# Patient Record
Sex: FEMALE | Race: WHITE | NOT HISPANIC OR LATINO | Employment: FULL TIME | URBAN - METROPOLITAN AREA
[De-identification: names, ages, dates, MRNs, and addresses within clinical notes are randomized per-mention and may not be internally consistent; named-entity substitution may affect disease eponyms.]

---

## 2024-06-30 ENCOUNTER — HOSPITAL ENCOUNTER (EMERGENCY)
Facility: HOSPITAL | Age: 44
Discharge: HOME | End: 2024-06-30
Payer: COMMERCIAL

## 2024-06-30 ENCOUNTER — APPOINTMENT (OUTPATIENT)
Dept: RADIOLOGY | Facility: HOSPITAL | Age: 44
End: 2024-06-30
Payer: COMMERCIAL

## 2024-06-30 VITALS
RESPIRATION RATE: 19 BRPM | OXYGEN SATURATION: 99 % | DIASTOLIC BLOOD PRESSURE: 94 MMHG | WEIGHT: 268 LBS | HEART RATE: 97 BPM | HEIGHT: 67 IN | BODY MASS INDEX: 42.06 KG/M2 | SYSTOLIC BLOOD PRESSURE: 152 MMHG | TEMPERATURE: 97.4 F

## 2024-06-30 DIAGNOSIS — M25.511 RIGHT SHOULDER PAIN, UNSPECIFIED CHRONICITY: Primary | ICD-10-CM

## 2024-06-30 PROCEDURE — 99283 EMERGENCY DEPT VISIT LOW MDM: CPT

## 2024-06-30 PROCEDURE — 73030 X-RAY EXAM OF SHOULDER: CPT | Mod: RT

## 2024-06-30 PROCEDURE — 73030 X-RAY EXAM OF SHOULDER: CPT | Mod: RIGHT SIDE | Performed by: RADIOLOGY

## 2024-06-30 ASSESSMENT — PAIN DESCRIPTION - DESCRIPTORS: DESCRIPTORS: SHOOTING

## 2024-06-30 ASSESSMENT — PAIN DESCRIPTION - FREQUENCY: FREQUENCY: CONSTANT/CONTINUOUS

## 2024-06-30 ASSESSMENT — COLUMBIA-SUICIDE SEVERITY RATING SCALE - C-SSRS
1. IN THE PAST MONTH, HAVE YOU WISHED YOU WERE DEAD OR WISHED YOU COULD GO TO SLEEP AND NOT WAKE UP?: NO
2. HAVE YOU ACTUALLY HAD ANY THOUGHTS OF KILLING YOURSELF?: NO
6. HAVE YOU EVER DONE ANYTHING, STARTED TO DO ANYTHING, OR PREPARED TO DO ANYTHING TO END YOUR LIFE?: NO

## 2024-06-30 ASSESSMENT — PAIN SCALES - GENERAL: PAINLEVEL_OUTOF10: 8

## 2024-06-30 ASSESSMENT — PAIN - FUNCTIONAL ASSESSMENT: PAIN_FUNCTIONAL_ASSESSMENT: 0-10

## 2024-06-30 ASSESSMENT — PAIN DESCRIPTION - PAIN TYPE: TYPE: ACUTE PAIN

## 2024-06-30 ASSESSMENT — PAIN DESCRIPTION - LOCATION: LOCATION: ARM

## 2024-06-30 ASSESSMENT — PAIN DESCRIPTION - ORIENTATION: ORIENTATION: RIGHT

## 2024-06-30 NOTE — ED TRIAGE NOTES
Patient arrived to ED from home wth c/o right shoulder pain that radiates down to her elbow. Patient reports numbness to right elbow. Patient reports a slip and fall 3 wks ago, was initially in pain for a few days, pain went away. Patient reports she woke up yesterday to severe pain that keeps getting worse. Patient reports taking 800mg Ibupr around 1800 last night. Patient denies any other pain. Patient stable at this time.

## 2024-06-30 NOTE — ED PROVIDER NOTES
EMERGENCY MEDICINE EVALUATION NOTE    History of Present Illness     Chief Complaint:   Chief Complaint   Patient presents with    Shoulder Pain     Fell 3 wks ago on right shoulder       HPI: Oumou Cox is a 43 y.o. female presents with a chief complaint of right shoulder pain.  Patient reports that it started about 3 weeks ago after she fell onto the right shoulder.  She states that initially only hurt for 3 days but it went away.  She states that on Friday started to come back.  She reports that she has more pain with range of motion.  Reports that any abduction increases pain.  Patient denies any loss of neurovascular status to the right upper extremity.  She denies associated chest pain or shortness of breath.  Patient denies any previous surgery or fractures to the right upper extremity.  She reports he takes ibuprofen at home which has minimally improved the pain.    Previous History   No past medical history on file.  Past Surgical History:   Procedure Laterality Date    OTHER SURGICAL HISTORY  07/05/2019    Ectopic pregnancy removal with salpingectomy    OTHER SURGICAL HISTORY  01/09/2020    Appendectomy    OTHER SURGICAL HISTORY  01/09/2020    Oophorectomy    OTHER SURGICAL HISTORY  01/09/2020    Hysterectomy        No family history on file.  Allergies   Allergen Reactions    Shellfish Derived Anaphylaxis    Penicillins GI Upset     No current outpatient medications    Physical Exam     Appearance: Alert, oriented , cooperative     Skin: Intact,  dry skin, no lesions, rash, petechiae or purpura.      Eyes: PERRLA, EOMs intact,  Conjunctiva pink      ENT: Hearing grossly intact. Pharynx clear     Neck: Supple. Trachea at midline.      Pulmonary: Clear bilaterally. No rales, rhonchi or wheezing. No accessory muscle use or stridor.     Cardiac: Normal rate and rhythm without murmur     Abdomen: Soft, nontender, active bowel sounds.     Musculoskeletal: Decreased range of motion to right shoulder  "secondary to pain.  Patient does have minimal abduction and adduction as well as flexion extension however range of motion is decreased secondary to pain.  Neurovascular intact in right upper extremity.  Full range of motion at right elbow.  Patient has diffuse tenderness across anterior lateral shoulder joint specifically near AC joint.     Neurological:Cranial nerves II through XII are grossly intact, normal sensation, no weakness, no focal findings identified.     Results   Labs Reviewed - No data to display  XR shoulder right 2+ views   Final Result   Limited exam shows no acute bony abnormalities             MACRO:   None        Signed by: Laisha Archuleta 6/30/2024 7:22 AM   Dictation workstation:   [id number]            ED Course & Medical Decision Making   Medications - No data to display  Heart Rate:  [97]   Temperature:  [36.3 °C (97.4 °F)]   Respirations:  [19]   BP: (152)/(94)   Height:  [170.2 cm (5' 7\")]   Weight:  [122 kg (268 lb)]   Pulse Ox:  [99 %]    ED Course as of 06/30/24 0748   Sun Jun 30, 2024   0651 Discussed plan of care with the patient.  He was offered Toradol IM injection which she declined.  Patient also offered EKG which she declined stating she has no chest pain.  She requested to only have x-ray performed at this time.  Patient will have x-ray and be reassessed. [CJ]   9157 Updated the patient on the results of the x-ray.  Patient's x-ray did not show any fractures or dislocations.  Plan of care will include providing her with a sling.  We discussed pain medication at home.  Patient was offered muscle relaxer which she declined stating it does not not make her feel well.  Patient states that she will just take Tylenol Motrin.  We discussed the dosing regimen for this.  Patient be given referral to orthopedics for follow-up as well.  Patient encouraged return here immediately with any worsening symptoms. [CJ]      ED Course User Index  [CJ] Aureliano Garrido PA-C         Diagnoses as of " 06/30/24 0748   Right shoulder pain, unspecified chronicity       Procedures   Procedures    Diagnosis     1. Right shoulder pain, unspecified chronicity        Disposition   Discharged     ED Prescriptions    None         Disclaimer: This note was dictated by speech recognition. Minor errors in transcription may be present. Please call if questions.       Aureliano Garrido PA-C  06/30/24 0748

## 2024-10-21 ENCOUNTER — HOSPITAL ENCOUNTER (EMERGENCY)
Facility: HOSPITAL | Age: 44
Discharge: HOME | End: 2024-10-21
Payer: COMMERCIAL

## 2024-10-21 ENCOUNTER — APPOINTMENT (OUTPATIENT)
Dept: RADIOLOGY | Facility: HOSPITAL | Age: 44
End: 2024-10-21
Payer: COMMERCIAL

## 2024-10-21 VITALS
OXYGEN SATURATION: 96 % | DIASTOLIC BLOOD PRESSURE: 85 MMHG | SYSTOLIC BLOOD PRESSURE: 138 MMHG | RESPIRATION RATE: 18 BRPM | WEIGHT: 260 LBS | HEIGHT: 67 IN | TEMPERATURE: 97.3 F | HEART RATE: 64 BPM | BODY MASS INDEX: 40.81 KG/M2

## 2024-10-21 DIAGNOSIS — R19.7 DIARRHEA, UNSPECIFIED TYPE: Primary | ICD-10-CM

## 2024-10-21 LAB
ALBUMIN SERPL BCP-MCNC: 5 G/DL (ref 3.4–5)
ALP SERPL-CCNC: 70 U/L (ref 33–110)
ALT SERPL W P-5'-P-CCNC: 26 U/L (ref 7–45)
ANION GAP SERPL CALC-SCNC: 11 MMOL/L (ref 10–20)
APPEARANCE UR: CLEAR
AST SERPL W P-5'-P-CCNC: 21 U/L (ref 9–39)
B-HCG SERPL-ACNC: 3 MIU/ML
BASOPHILS # BLD AUTO: 0.1 X10*3/UL (ref 0–0.1)
BASOPHILS NFR BLD AUTO: 1.2 %
BILIRUB SERPL-MCNC: 0.4 MG/DL (ref 0–1.2)
BILIRUB UR STRIP.AUTO-MCNC: NEGATIVE MG/DL
BUN SERPL-MCNC: 12 MG/DL (ref 6–23)
CALCIUM SERPL-MCNC: 9.9 MG/DL (ref 8.6–10.3)
CHLORIDE SERPL-SCNC: 102 MMOL/L (ref 98–107)
CO2 SERPL-SCNC: 28 MMOL/L (ref 21–32)
COLOR UR: COLORLESS
CREAT SERPL-MCNC: 0.79 MG/DL (ref 0.5–1.05)
EGFRCR SERPLBLD CKD-EPI 2021: >90 ML/MIN/1.73M*2
EOSINOPHIL # BLD AUTO: 0.36 X10*3/UL (ref 0–0.7)
EOSINOPHIL NFR BLD AUTO: 4.2 %
ERYTHROCYTE [DISTWIDTH] IN BLOOD BY AUTOMATED COUNT: 12.5 % (ref 11.5–14.5)
GLUCOSE SERPL-MCNC: 106 MG/DL (ref 74–99)
GLUCOSE UR STRIP.AUTO-MCNC: NORMAL MG/DL
HCT VFR BLD AUTO: 48.8 % (ref 36–46)
HGB BLD-MCNC: 17 G/DL (ref 12–16)
HOLD SPECIMEN: NORMAL
IMM GRANULOCYTES # BLD AUTO: 0.02 X10*3/UL (ref 0–0.7)
IMM GRANULOCYTES NFR BLD AUTO: 0.2 % (ref 0–0.9)
KETONES UR STRIP.AUTO-MCNC: NEGATIVE MG/DL
LACTATE SERPL-SCNC: 0.9 MMOL/L (ref 0.4–2)
LEUKOCYTE ESTERASE UR QL STRIP.AUTO: NEGATIVE
LIPASE SERPL-CCNC: 37 U/L (ref 9–82)
LYMPHOCYTES # BLD AUTO: 2.86 X10*3/UL (ref 1.2–4.8)
LYMPHOCYTES NFR BLD AUTO: 33.2 %
MCH RBC QN AUTO: 30.7 PG (ref 26–34)
MCHC RBC AUTO-ENTMCNC: 34.8 G/DL (ref 32–36)
MCV RBC AUTO: 88 FL (ref 80–100)
MONOCYTES # BLD AUTO: 0.44 X10*3/UL (ref 0.1–1)
MONOCYTES NFR BLD AUTO: 5.1 %
NEUTROPHILS # BLD AUTO: 4.83 X10*3/UL (ref 1.2–7.7)
NEUTROPHILS NFR BLD AUTO: 56.1 %
NITRITE UR QL STRIP.AUTO: NEGATIVE
NRBC BLD-RTO: 0 /100 WBCS (ref 0–0)
PH UR STRIP.AUTO: 5 [PH]
PLATELET # BLD AUTO: 284 X10*3/UL (ref 150–450)
POTASSIUM SERPL-SCNC: 3.9 MMOL/L (ref 3.5–5.3)
PROT SERPL-MCNC: 9.1 G/DL (ref 6.4–8.2)
PROT UR STRIP.AUTO-MCNC: NEGATIVE MG/DL
RBC # BLD AUTO: 5.53 X10*6/UL (ref 4–5.2)
RBC # UR STRIP.AUTO: NEGATIVE /UL
SODIUM SERPL-SCNC: 137 MMOL/L (ref 136–145)
SP GR UR STRIP.AUTO: 1.01
UROBILINOGEN UR STRIP.AUTO-MCNC: NORMAL MG/DL
WBC # BLD AUTO: 8.6 X10*3/UL (ref 4.4–11.3)

## 2024-10-21 PROCEDURE — 2500000004 HC RX 250 GENERAL PHARMACY W/ HCPCS (ALT 636 FOR OP/ED): Performed by: PHYSICIAN ASSISTANT

## 2024-10-21 PROCEDURE — 83605 ASSAY OF LACTIC ACID: CPT | Performed by: PHYSICIAN ASSISTANT

## 2024-10-21 PROCEDURE — 74177 CT ABD & PELVIS W/CONTRAST: CPT | Performed by: RADIOLOGY

## 2024-10-21 PROCEDURE — 74177 CT ABD & PELVIS W/CONTRAST: CPT

## 2024-10-21 PROCEDURE — 84702 CHORIONIC GONADOTROPIN TEST: CPT | Performed by: PHYSICIAN ASSISTANT

## 2024-10-21 PROCEDURE — 85025 COMPLETE CBC W/AUTO DIFF WBC: CPT | Performed by: PHYSICIAN ASSISTANT

## 2024-10-21 PROCEDURE — 36415 COLL VENOUS BLD VENIPUNCTURE: CPT | Performed by: PHYSICIAN ASSISTANT

## 2024-10-21 PROCEDURE — 2550000001 HC RX 255 CONTRASTS: Performed by: PHYSICIAN ASSISTANT

## 2024-10-21 PROCEDURE — 83690 ASSAY OF LIPASE: CPT | Performed by: PHYSICIAN ASSISTANT

## 2024-10-21 PROCEDURE — 96360 HYDRATION IV INFUSION INIT: CPT | Mod: 59

## 2024-10-21 PROCEDURE — 81003 URINALYSIS AUTO W/O SCOPE: CPT | Performed by: PHYSICIAN ASSISTANT

## 2024-10-21 PROCEDURE — 80053 COMPREHEN METABOLIC PANEL: CPT | Performed by: PHYSICIAN ASSISTANT

## 2024-10-21 PROCEDURE — 99284 EMERGENCY DEPT VISIT MOD MDM: CPT | Mod: 25

## 2024-10-21 RX ORDER — DICYCLOMINE HYDROCHLORIDE 20 MG/1
20 TABLET ORAL 2 TIMES DAILY
Qty: 20 TABLET | Refills: 0 | Status: SHIPPED | OUTPATIENT
Start: 2024-10-21 | End: 2024-10-31

## 2024-10-21 ASSESSMENT — COLUMBIA-SUICIDE SEVERITY RATING SCALE - C-SSRS
6. HAVE YOU EVER DONE ANYTHING, STARTED TO DO ANYTHING, OR PREPARED TO DO ANYTHING TO END YOUR LIFE?: NO
1. IN THE PAST MONTH, HAVE YOU WISHED YOU WERE DEAD OR WISHED YOU COULD GO TO SLEEP AND NOT WAKE UP?: NO
2. HAVE YOU ACTUALLY HAD ANY THOUGHTS OF KILLING YOURSELF?: NO

## 2024-10-21 ASSESSMENT — PAIN DESCRIPTION - LOCATION: LOCATION: ABDOMEN

## 2024-10-21 ASSESSMENT — PAIN SCALES - GENERAL: PAINLEVEL_OUTOF10: 5 - MODERATE PAIN

## 2024-10-21 ASSESSMENT — PAIN DESCRIPTION - DESCRIPTORS: DESCRIPTORS: SHARP

## 2024-10-21 ASSESSMENT — PAIN - FUNCTIONAL ASSESSMENT: PAIN_FUNCTIONAL_ASSESSMENT: 0-10

## 2024-10-21 NOTE — ED TRIAGE NOTES
Pt to ED with c/o LLQ abd pain and diarrhea x10 days. Reports 6 episodes since 0200, reports yellow liquid. Pt reports chills.

## 2024-10-21 NOTE — ED PROVIDER NOTES
EMERGENCY MEDICINE EVALUATION NOTE    History of Present Illness     Chief Complaint:   Chief Complaint   Patient presents with    Abdominal Pain    Diarrhea       HPI: Oumou Cox is a 44 y.o. female presents with a chief complaint of diarrhea.  Patient reports that she is been having diarrhea now for about 10 days.  She states that it started last Wednesday after eating takeout food.  She says she initially had food poisoning but the pain and diarrhea has persisted.  She states that she occasionally gets a spasm and cramping pain in the left lower quadrant.  Patient reports that the diarrhea is somewhat yellow in color but denies any blood present.  Patient denies any fevers or chills.  Patient did not take anything at home for symptoms.  She reports that she has had a little bit of a decreased p.o. intake secondary to not wanting to eat as much.  Patient denies any urinary symptoms such as dysuria or frequency.  Patient has any history of any diverticulitis or colitis.    Previous History     Past Medical History:   Diagnosis Date    Asthma     Diabetes mellitus (Multi)      Past Surgical History:   Procedure Laterality Date    HYSTERECTOMY      OTHER SURGICAL HISTORY  07/05/2019    Ectopic pregnancy removal with salpingectomy    OTHER SURGICAL HISTORY  01/09/2020    Appendectomy    OTHER SURGICAL HISTORY  01/09/2020    Oophorectomy    OTHER SURGICAL HISTORY  01/09/2020    Hysterectomy     Social History     Tobacco Use    Smoking status: Every Day     Types: Cigarettes    Smokeless tobacco: Never   Vaping Use    Vaping status: Never Used   Substance Use Topics    Alcohol use: Not Currently    Drug use: Never     No family history on file.  Allergies   Allergen Reactions    Shellfish Derived Anaphylaxis    Penicillins GI Upset     Current Outpatient Medications   Medication Instructions    dicyclomine (BENTYL) 20 mg, oral, 2 times daily       Physical Exam     Appearance: =Alert, oriented , cooperative      Skin: Intact,  dry skin, no lesions, rash, petechiae or purpura.      Eyes: PERRLA, EOMs intact,  Conjunctiva pink      ENT: Hearing grossly intact. Pharynx clear     Neck: Supple. Trachea at midline.      Pulmonary: Clear bilaterally. No rales, rhonchi or wheezing. No accessory muscle use or stridor.     Cardiac: Tachycardic.     Abdomen: Soft, active bowel sounds.  Left lower quadrant abdominal tenderness without guarding or rebound.     Musculoskeletal: Full range of motion.      Neurological:Cranial nerves II through XII are grossly intact, normal sensation, no weakness, no focal findings identified.     Results     Labs Reviewed   CBC WITH AUTO DIFFERENTIAL - Abnormal       Result Value    WBC 8.6      nRBC 0.0      RBC 5.53 (*)     Hemoglobin 17.0 (*)     Hematocrit 48.8 (*)     MCV 88      MCH 30.7      MCHC 34.8      RDW 12.5      Platelets 284      Neutrophils % 56.1      Immature Granulocytes %, Automated 0.2      Lymphocytes % 33.2      Monocytes % 5.1      Eosinophils % 4.2      Basophils % 1.2      Neutrophils Absolute 4.83      Immature Granulocytes Absolute, Automated 0.02      Lymphocytes Absolute 2.86      Monocytes Absolute 0.44      Eosinophils Absolute 0.36      Basophils Absolute 0.10     COMPREHENSIVE METABOLIC PANEL - Abnormal    Glucose 106 (*)     Sodium 137      Potassium 3.9      Chloride 102      Bicarbonate 28      Anion Gap 11      Urea Nitrogen 12      Creatinine 0.79      eGFR >90      Calcium 9.9      Albumin 5.0      Alkaline Phosphatase 70      Total Protein 9.1 (*)     AST 21      Bilirubin, Total 0.4      ALT 26     URINALYSIS WITH REFLEX CULTURE AND MICROSCOPIC - Abnormal    Color, Urine Colorless (*)     Appearance, Urine Clear      Specific Gravity, Urine 1.007      pH, Urine 5.0      Protein, Urine NEGATIVE      Glucose, Urine Normal      Blood, Urine NEGATIVE      Ketones, Urine NEGATIVE      Bilirubin, Urine NEGATIVE      Urobilinogen, Urine Normal      Nitrite, Urine  NEGATIVE      Leukocyte Esterase, Urine NEGATIVE     LACTATE - Normal    Lactate 0.9      Narrative:     Venipuncture immediately after or during the administration of Metamizole may lead to falsely low results. Testing should be performed immediately prior to Metamizole dosing.   LIPASE - Normal    Lipase 37      Narrative:     Venipuncture immediately after or during the administration of Metamizole may lead to falsely low results. Testing should be performed immediately prior to Metamizole dosing.   HUMAN CHORIONIC GONADOTROPIN, SERUM QUANTITATIVE - Normal    HCG, Beta-Quantitative 3      Narrative:      Total HCG measurement is performed using the [employer]   Immunoassay which detects intact HCG and free beta HCG subunit.    This test is not indicated for use as a tumor marker.   HCG testing is performed using a different test methodology at [facility] than other [facility]. Direct result comparison   should only be made within the same method.       URINALYSIS WITH REFLEX CULTURE AND MICROSCOPIC    Narrative:     The following orders were created for panel order Urinalysis with Reflex Culture and Microscopic.  Procedure                               Abnormality         Status                     ---------                               -----------         ------                     Urinalysis with Reflex C...[[id number]]  Abnormal            Final result               Extra Urine Gray Tube[[id number]]                            In process                   Please view results for these tests on the individual orders.   EXTRA URINE GRAY TUBE     CT abdomen pelvis w IV contrast   Final Result   No acute abnormality.        Stable bilateral adrenal nodules likely adenomas.        Hepatomegaly with hepatic steatosis.        Signed by: Mirna Sánchez 10/21/2024 12:09 PM   Dictation workstation:   [id number]            ED Course & Medical Decision Making     Medications   sodium  "chloride 0.9 % bolus 1,000 mL (0 mL intravenous Stopped 10/21/24 1053)   iohexol (OMNIPaque) 350 mg iodine/mL solution 100 mL (100 mL intravenous Given 10/21/24 1101)     Heart Rate:  [118]   Temperature:  [36.3 °C (97.3 °F)]   Respirations:  [18]   BP: (170)/(103)   Height:  [170.2 cm (5' 7\")]   Weight:  [118 kg (260 lb)]   Pulse Ox:  [97 %]    ED Course as of 10/21/24 1235   Mon Oct 21, 2024   1232 Updated the patient the results of her workup.  Patient's workup does not show any significant abnormalities.  Patient CBC was within normal limits CMP did not show any acute abnormalities.  Urinalysis was normal, lipase was negative.  Patient CT imaging of the abdomen pelvis did not show any specific colitis or diverticulitis.  Discussed plan of care going forward patient.  Patient given referral to GI and will also be given Bentyl at home for her symptoms.  Patient encouraged to return the emerged primarily with any worsening symptoms or to follow-up with her primary care provider in 1 to 2 days. [CJ]      ED Course User Index  [CJ] Aureliano Garrido PA-C         Diagnoses as of 10/21/24 1235   Diarrhea, unspecified type       Procedures   Procedures    Diagnosis     1. Diarrhea, unspecified type        Disposition   Discharged    ED Prescriptions       Medication Sig Dispense Start Date End Date Auth. Provider    dicyclomine (Bentyl) 20 mg tablet Take 1 tablet (20 mg) by mouth 2 times a day for 10 days. 20 tablet 10/21/2024 10/31/2024 Aureliano Garrido PA-C            Disclaimer: This note was dictated by speech recognition. Minor errors in transcription may be present. Please call if questions.       Aureliano Garrido PA-C  10/21/24 1236    "

## 2025-02-14 ENCOUNTER — APPOINTMENT (OUTPATIENT)
Facility: CLINIC | Age: 45
End: 2025-02-14
Payer: COMMERCIAL

## 2025-02-14 VITALS
HEART RATE: 83 BPM | WEIGHT: 262.6 LBS | BODY MASS INDEX: 41.21 KG/M2 | HEIGHT: 67 IN | OXYGEN SATURATION: 95 % | DIASTOLIC BLOOD PRESSURE: 84 MMHG | SYSTOLIC BLOOD PRESSURE: 127 MMHG

## 2025-02-14 DIAGNOSIS — K59.09 CHRONIC CONSTIPATION: ICD-10-CM

## 2025-02-14 DIAGNOSIS — R19.4 CHANGE IN BOWEL HABITS: ICD-10-CM

## 2025-02-14 DIAGNOSIS — R10.9 ABDOMINAL PAIN, UNSPECIFIED ABDOMINAL LOCATION: Primary | ICD-10-CM

## 2025-02-14 PROBLEM — E78.5 HYPERLIPIDEMIA, UNSPECIFIED: Status: ACTIVE | Noted: 2023-05-23

## 2025-02-14 PROBLEM — E66.01 MORBID OBESITY (MULTI): Status: ACTIVE | Noted: 2023-06-07

## 2025-02-14 PROBLEM — K21.9 GERD (GASTROESOPHAGEAL REFLUX DISEASE): Status: ACTIVE | Noted: 2023-06-07

## 2025-02-14 PROBLEM — R51.9 HEADACHE: Status: ACTIVE | Noted: 2024-03-06

## 2025-02-14 PROBLEM — M25.512 LEFT SHOULDER PAIN: Status: ACTIVE | Noted: 2025-02-14

## 2025-02-14 PROBLEM — D39.10 NEOPLASM OF OVARY WITH LOW MALIGNANT POTENTIAL: Status: ACTIVE | Noted: 2021-05-03

## 2025-02-14 PROBLEM — R00.2 PALPITATIONS: Status: ACTIVE | Noted: 2025-02-14

## 2025-02-14 PROBLEM — K76.0 STEATOSIS OF LIVER: Status: ACTIVE | Noted: 2025-02-14

## 2025-02-14 PROBLEM — J45.909 ASTHMA: Status: ACTIVE | Noted: 2023-06-07

## 2025-02-14 PROBLEM — R94.39 ABNORMAL STRESS TEST: Status: ACTIVE | Noted: 2023-08-24

## 2025-02-14 PROBLEM — Z90.710 S/P HYSTERECTOMY: Status: RESOLVED | Noted: 2019-08-22 | Resolved: 2025-02-14

## 2025-02-14 PROBLEM — R06.02 SHORTNESS OF BREATH ON EXERTION: Status: ACTIVE | Noted: 2025-02-14

## 2025-02-14 PROBLEM — M54.9 BACK PAIN: Status: ACTIVE | Noted: 2025-02-14

## 2025-02-14 PROBLEM — E11.9 DIABETES (MULTI): Status: ACTIVE | Noted: 2023-06-07

## 2025-02-14 PROBLEM — K44.9 HIATAL HERNIA: Status: ACTIVE | Noted: 2023-06-07

## 2025-02-14 PROBLEM — K58.1 IRRITABLE BOWEL SYNDROME WITH CONSTIPATION: Status: ACTIVE | Noted: 2025-02-14

## 2025-02-14 PROCEDURE — 3079F DIAST BP 80-89 MM HG: CPT | Performed by: INTERNAL MEDICINE

## 2025-02-14 PROCEDURE — 3074F SYST BP LT 130 MM HG: CPT | Performed by: INTERNAL MEDICINE

## 2025-02-14 PROCEDURE — 3008F BODY MASS INDEX DOCD: CPT | Performed by: INTERNAL MEDICINE

## 2025-02-14 PROCEDURE — 99214 OFFICE O/P EST MOD 30 MIN: CPT | Performed by: INTERNAL MEDICINE

## 2025-02-14 RX ORDER — FAMOTIDINE 40 MG/1
40 TABLET, FILM COATED ORAL NIGHTLY
COMMUNITY
End: 2025-02-14 | Stop reason: ALTCHOICE

## 2025-02-14 RX ORDER — METFORMIN HYDROCHLORIDE 500 MG/1
500 TABLET, EXTENDED RELEASE ORAL
COMMUNITY
Start: 2024-11-11

## 2025-02-14 RX ORDER — DICYCLOMINE HYDROCHLORIDE 10 MG/1
10 CAPSULE ORAL 4 TIMES DAILY
COMMUNITY
End: 2025-02-14 | Stop reason: ALTCHOICE

## 2025-02-14 RX ORDER — OMEPRAZOLE 40 MG/1
40 CAPSULE, DELAYED RELEASE ORAL
COMMUNITY

## 2025-02-14 ASSESSMENT — ENCOUNTER SYMPTOMS
ARTHRALGIAS: 0
DYSURIA: 0
WHEEZING: 0
ADENOPATHY: 0
BRUISES/BLEEDS EASILY: 0
SEIZURES: 0
SHORTNESS OF BREATH: 0
DIZZINESS: 0
HEMATURIA: 0
ROS GI COMMENTS: AS DETAILED ABOVE.
HEADACHES: 0
CONFUSION: 0
WEAKNESS: 0
SORE THROAT: 0
UNEXPECTED WEIGHT CHANGE: 0
NERVOUS/ANXIOUS: 0
CHILLS: 0
VOICE CHANGE: 0
FATIGUE: 0
PALPITATIONS: 0
FEVER: 0
COUGH: 0
TROUBLE SWALLOWING: 0
MYALGIAS: 0
NUMBNESS: 0

## 2025-02-14 NOTE — PROGRESS NOTES
"    [facility]astroenterology    ASSESSMENT and PLAN:       Oumou Cox is a 44 y.o. female with a significant past medical history of asthma, obesity, prediabetes, back pain, tobacco use, hepatic steatosis, GERD, anxiety, and HLD who presents for consultation requested by her primary care provider (Marie Greene MD) for the evaluation of constipation.       IBS-c  Symptoms of abdominal pain, constipation, and change in bowel movements that are most consistent with IBS-c. Possible that adhesions from previous surgeries are playing a role as well. No concerning findings on previous CT. With change in bowel habits will plan for colonoscopy to rule out other underlying process, but that is less likely and as I explained to her colonoscopy is not \"required\" for a diagnosis of IBS. Would not recommend Bentyl as the data is lacking for its effectiveness and it would potentially worsen constipation. Bentyl is recommended against in GI society guidelines for IBS. Suggest that she continue to use fiber supplement and consider the addition of MiraLAX for constipation.  - continue fiber supplement  - colonoscopy scheduled in endo  - trial of IBGard for symptoms      GERD  Symptoms of GERD that have been poorly controlled with daily H2RA. Lack of response is likely in part related to expected tachyphylaxis with daily use. Agree with plan to start PPI. No concerning findings on previous EGD. Would recommend that after 8-12 weeks of daily PPI she attempt to decrease the dose to the lowest effective dose/stop the medication. Weight loss and quitting tobacco will also help GERD.  - PPI      Follow up with GI as needed.          Caesar Bean MD        Senior Attending Physician, Gastroenterology    [facility]    Clinical   [facility] [employer]        Subjective   HISTORY OF PRESENT ILLNESS:     Chief " Complaint  Constipation    History Of Present Illness:    Oumou Cox is a 44 y.o. female with a significant past medical history of asthma, obesity, prediabetes, back pain, tobacco use, hepatic steatosis, GERD, anxiety, and HLD who presents for consultation requested by her primary care provider (Marie Greene MD) for the evaluation of constipation.     She was seen by her PCP in November 2019 and complained of RUQ pain. She was diagnosed with GERD and started on Omeprazole (20 mg daily). A RUQ US was also ordered which revealed hepatic steatosis, but was otherwise normal with no cholelithiasis. I had initially seen her in January 2020 at which time she complained of burning chest/epigastric pain that had worsened after weight gain. She had also reported unresolved symptoms despite a trial of PPI and EGD was done on 2/11/2020 which showed erythematous mucosa in the gastric antrum and duodenal bulb (mild gastritis with no H pylori, IM, or celiac on pathology), but was otherwise normal.     I last saw her in 2020 at which time she reported worse constipation since the time of her hysterectomy. We discussed starting MiraLAX and titrating the dose of that medication as needed. I had recommended follow up in 4-6 months, but she was then lost to follow up. She was subsequently seen by hepatology (Dr. Parker) for hepatic steatosis and fibroscan was done which showed stage 2 disease. He recommended repeat LFTs in 6 months and follow up with him in 6-7 months. This was in 2021 and she has not followed up with Dr. Parker since that time.    There are no recent notes from her PCP in the EMR. The most recent labs are from 2024 and include an unremarkable CBC, unremarkable CMP, and normal lipase.      She says that back in October she was having diarrhea. At that time she had been on Metformin. She stopped taking Metformin and symptoms improved. After that she had a course of antibiotics which again prompted diarrhea.  This lasted for a month and then her BMs returned to her baseline of constipation. She is now having a daily BM, but stool is small and hard. She will have straining with a BM as well. This has been associated with gas and bloating as well as left sided pain that she describes as a pressure. She has started a fiber supplement which has improved her BMs, but she continues to have a sensation of incomplete emptying.    She says that her primary care told her that IBS can't be diagnosed without a colonoscopy and requested that she get a colonoscopy.    She also reports issues with heartburn that she describes as a burning in her chest that is worse with laying down. She has been on Pepcid for this, but feels like that hasn't been controlling her symptoms. Her PCP recently prescribed Omeprazole.      Patient denies any N/V, dysphagia, odynophagia, hematemesis, hematochezia, melena, or unexpected weight loss.      Review of systems:   Review of Systems   Constitutional:  Negative for chills, fatigue, fever and unexpected weight change.   HENT:  Negative for congestion, sneezing, sore throat, trouble swallowing and voice change.    Respiratory:  Negative for cough, shortness of breath and wheezing.    Cardiovascular:  Negative for chest pain, palpitations and leg swelling.   Gastrointestinal:         As detailed above.   Genitourinary:  Negative for dysuria and hematuria.   Musculoskeletal:  Negative for arthralgias and myalgias.   Skin:  Negative for pallor and rash.   Neurological:  Negative for dizziness, seizures, syncope, weakness, numbness and headaches.   Hematological:  Negative for adenopathy. Does not bruise/bleed easily.   Psychiatric/Behavioral:  Negative for confusion. The patient is not nervous/anxious.    All other systems reviewed and are negative.      I performed a complete 10 point review of systems and it is negative except as noted in HPI or above.      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    Past Surgical History:  She has a past surgical history that includes Other surgical history (07/05/2019); Other surgical history (01/09/2020); Other surgical history (01/09/2020); Other surgical history (01/09/2020); and Hysterectomy.      Social History:  She reports that she has been smoking cigarettes. She has never used smokeless tobacco. She reports that she does not currently use alcohol. She reports that she does not use drugs.    Family History:  No known family history of GI disease, specifically denies any family history of pancreatitis, Crohn's, colon cancer, gastroesophageal cancer, or ulcerative colitis.    Family History   Problem Relation Name Age of Onset    Breast cancer Mother      Pancreatic cancer Father      Liver cancer Father      Heart disease Maternal Grandmother      Stroke Paternal Grandmother          Allergies:  Shellfish derived, Clindamycin, and Penicillins      Objective   OBJECTIVE:       Last Recorded Vitals:  Vitals:    02/14/25 1358   BP: 127/84   BP Location: Left arm   Patient Position: Sitting   BP Cuff Size: Large adult   Pulse: 83   SpO2: 95%   Weight: 119 kg (262 lb 9.6 oz)   Height: 1.702 m (5' 7\")     /84 (BP Location: Left arm, Patient Position: Sitting, BP Cuff Size: Large adult)   Pulse 83   Ht 1.702 m (5' 7\")   Wt 119 kg (262 lb 9.6 oz)   SpO2 95%   BMI 41.13 kg/m²      Physical " Exam:    Physical Exam  Vitals reviewed.   Constitutional:       General: She is not in acute distress.     Appearance: She is obese. She is not ill-appearing.   HENT:      Head: Normocephalic and atraumatic.   Eyes:      General: No scleral icterus.  Cardiovascular:      Rate and Rhythm: Normal rate and regular rhythm.      Pulses: Normal pulses.      Heart sounds: Normal heart sounds. No murmur heard.  Pulmonary:      Effort: Pulmonary effort is normal. No respiratory distress.      Breath sounds: Normal breath sounds. No wheezing.   Abdominal:      General: Bowel sounds are normal.      Palpations: Abdomen is soft.      Tenderness: There is no abdominal tenderness. There is no rebound.   Musculoskeletal:         General: No swelling or deformity.   Skin:     General: Skin is warm and dry.      Coloration: Skin is not jaundiced.      Findings: No rash.   Neurological:      General: No focal deficit present.      Mental Status: She is alert and oriented to person, place, and time.   Psychiatric:         Mood and Affect: Mood normal.         Behavior: Behavior normal.         Thought Content: Thought content normal.         Judgment: Judgment normal.         Home Medications:  Prior to Admission medications    Not on File         Relevant Results Recent labs reviewed in the EMR.    Lab Results   Component Value Date/Time    WBC 8.6 10/21/2024 0926    HGB 17.0 (H) 10/21/2024 0926    HGB 14.5 03/15/2022 2112    HGB 15.8 02/01/2021 1542    HGB 15.1 09/04/2020 1618    MCV 88 10/21/2024 0926     10/21/2024 0926     03/15/2022 2112     02/01/2021 1542     09/04/2020 1618       Lab Results   Component Value Date/Time     10/21/2024 0926    K 3.9 10/21/2024 0926     10/21/2024 0926    BUN 12 10/21/2024 0926    CREATININE 0.79 10/21/2024 0926    CREATININE 0.74 03/15/2022 2258    CREATININE 0.87 02/01/2021 1542       Lab Results   Component Value Date/Time    BILITOT 0.4 10/21/2024  "0926    BILITOT 0.4 02/01/2021 1542    BILITOT 0.3 11/10/2019 1157    ALKPHOS 70 10/21/2024 0926    ALKPHOS 71 02/01/2021 1542    ALKPHOS 61 11/10/2019 1157    AST 21 10/21/2024 0926    AST 24 02/01/2021 1542    AST 25 11/10/2019 1157    ALT 26 10/21/2024 0926    ALT 25 02/01/2021 1542    ALT 21 11/10/2019 1157    LIPASE 37 10/21/2024 0926       No results found for: \"CRP\", \"CALPS\"    Radiology: Imaging reviewed in the EMR.    === 10/21/24 ===    CT ABDOMEN PELVIS W IV CONTRAST    - Impression -  No acute abnormality.    Stable bilateral adrenal nodules likely adenomas.    Hepatomegaly with hepatic steatosis.    Signed by: Mirna Sánchez 10/21/2024 12:09 PM  Dictation workstation:   [id number]        "

## 2025-02-14 NOTE — PATIENT INSTRUCTIONS
Continue Omeprazole to block acid in your stomach.  You should take this medication every day.  Take it first thing in the morning about 30 minutes before breakfast.      Continue to use a fiber supplement for constipation. If you need to add something else I would recommend MiraLAX. This is available over the counter. I would recommend that you start taking 1 capful once daily.  If that is not effective after 4-5 days you can increase the dose.  You can increase the dose up to 2 capfuls twice daily if needed.  If the initial dose is causing your stools to be too loose or too many bowel movements then you can decrease the dose as much as you need to, but try to find a dose that you can take every day or every other day that is giving you soft and easy to pass stools.      You can also try an over the counter supplement called IBGard. This can help abdominal discomfort and bloating.      You have been scheduled for a colonoscopy.  You were given instructions for preparing for this test in the office today.  If you have questions about these instructions, please call my office at [phone].    After your procedure, you can expect me to talk to you to go over the results of the procedure. If polyps were removed I will usually be able to tell you my initial thoughts about those polyps and give you some idea of when you should have another colonoscopy.    If any polyps are removed during your procedure or if any biopsies are obtained those specimens will go to the pathologists to review under the microscope. Once those results are available they will be sent to me electronically to review. These results will also be available to you at that time through [facility]. I briefly review all of these results by the next business day to determine if there is any urgent information that needs to be communicated to you right away or anything that would significantly change what I told you at the time of the procedure. If there  is nothing urgent this is usually a good sign and I will then do a more extensive review of the result and send you a letter with my final recommendations within a week of the result becoming available. If you have questions or need additional information I urge you to call the office at [phone], but I do ask for patience as I spend a good portion of each day performing procedures like the one you are scheduled for and during those times I am not able to take or return routine phone calls.    You were also given information regarding the schedule for your procedure including the time that you need to arrive to the endoscopy unit.  You will also be contacted 2-3 day prior to your procedure to confirm the final arrival time.  If you have questions about this or if you need to cancel or change this appointment please call my office at [phone].         Follow up with GI as needed.

## 2025-02-14 NOTE — LETTER
"February 14, 2025     Marie Greene MD  [address]  [address] [address]    Patient: Oumou Cox   YOB: 1980   Date of Visit: 2/14/2025       Dear Dr. Marie Greene MD:    Thank you for referring Oumou Cox to me for evaluation. Below are my notes for this consultation.  If you have questions, please do not hesitate to call me. I look forward to following your patient along with you.       Sincerely,     Caesar Bean MD      CC: No Recipients  ______________________________________________________________________________________         [facility]    ASSESSMENT and PLAN:       Oumou Cox is a 44 y.o. female with a significant past medical history of asthma, obesity, prediabetes, back pain, tobacco use, hepatic steatosis, GERD, anxiety, and HLD who presents for consultation requested by her primary care provider (Marie Greene MD) for the evaluation of constipation.       IBS-c  Symptoms of abdominal pain, constipation, and change in bowel movements that are most consistent with IBS-c. Possible that adhesions from previous surgeries are playing a role as well. No concerning findings on previous CT. With change in bowel habits will plan for colonoscopy to rule out other underlying process, but that is less likely and as I explained to her colonoscopy is not \"required\" for a diagnosis of IBS. Would not recommend Bentyl as the data is lacking for its effectiveness and it would potentially worsen constipation. Bentyl is recommended against in GI society guidelines for IBS. Suggest that she continue to use fiber supplement and consider the addition of MiraLAX for constipation.  - continue fiber supplement  - colonoscopy scheduled in endo  - trial of IBGard for symptoms      GERD  Symptoms of GERD that have been poorly controlled with daily H2RA. Lack of response is likely in part related to expected tachyphylaxis with daily use. Agree with plan " to start PPI. No concerning findings on previous EGD. Would recommend that after 8-12 weeks of daily PPI she attempt to decrease the dose to the lowest effective dose/stop the medication. Weight loss and quitting tobacco will also help GERD.  - PPI      Follow up with GI as needed.          Caesar Bean MD        Senior Attending Physician, Gastroenterology    [facility]    Clinical   [employer]        Subjective   HISTORY OF PRESENT ILLNESS:     Chief Complaint  Constipation    History Of Present Illness:    Oumou Cox is a 44 y.o. female with a significant past medical history of asthma, obesity, prediabetes, back pain, tobacco use, hepatic steatosis, GERD, anxiety, and HLD who presents for consultation requested by her primary care provider (Marie Greene MD) for the evaluation of constipation.     She was seen by her PCP in November 2019 and complained of RUQ pain. She was diagnosed with GERD and started on Omeprazole (20 mg daily). A RUQ US was also ordered which revealed hepatic steatosis, but was otherwise normal with no cholelithiasis. I had initially seen her in January 2020 at which time she complained of burning chest/epigastric pain that had worsened after weight gain. She had also reported unresolved symptoms despite a trial of PPI and EGD was done on 2/11/2020 which showed erythematous mucosa in the gastric antrum and duodenal bulb (mild gastritis with no H pylori, IM, or celiac on pathology), but was otherwise normal.     I last saw her in 2020 at which time she reported worse constipation since the time of her hysterectomy. We discussed starting MiraLAX and titrating the dose of that medication as needed. I had recommended follow up in 4-6 months, but she was then lost to follow up. She was subsequently seen by hepatology (Dr. Parker) for hepatic steatosis and fibroscan was  done which showed stage 2 disease. He recommended repeat LFTs in 6 months and follow up with him in 6-7 months. This was in 2021 and she has not followed up with Dr. Parker since that time.    There are no recent notes from her PCP in the EMR. The most recent labs are from 2024 and include an unremarkable CBC, unremarkable CMP, and normal lipase.      She says that back in October she was having diarrhea. At that time she had been on Metformin. She stopped taking Metformin and symptoms improved. After that she had a course of antibiotics which again prompted diarrhea. This lasted for a month and then her BMs returned to her baseline of constipation. She is now having a daily BM, but stool is small and hard. She will have straining with a BM as well. This has been associated with gas and bloating as well as left sided pain that she describes as a pressure. She has started a fiber supplement which has improved her BMs, but she continues to have a sensation of incomplete emptying.    She says that her primary care told her that IBS can't be diagnosed without a colonoscopy and requested that she get a colonoscopy.    She also reports issues with heartburn that she describes as a burning in her chest that is worse with laying down. She has been on Pepcid for this, but feels like that hasn't been controlling her symptoms. Her PCP recently prescribed Omeprazole.      Patient denies any N/V, dysphagia, odynophagia, hematemesis, hematochezia, melena, or unexpected weight loss.      Review of systems:   Review of Systems   Constitutional:  Negative for chills, fatigue, fever and unexpected weight change.   HENT:  Negative for congestion, sneezing, sore throat, trouble swallowing and voice change.    Respiratory:  Negative for cough, shortness of breath and wheezing.    Cardiovascular:  Negative for chest pain, palpitations and leg swelling.   Gastrointestinal:         As detailed above.   Genitourinary:  Negative for dysuria  and hematuria.   Musculoskeletal:  Negative for arthralgias and myalgias.   Skin:  Negative for pallor and rash.   Neurological:  Negative for dizziness, seizures, syncope, weakness, numbness and headaches.   Hematological:  Negative for adenopathy. Does not bruise/bleed easily.   Psychiatric/Behavioral:  Negative for confusion. The patient is not nervous/anxious.    All other systems reviewed and are negative.      I performed a complete 10 point review of systems and it is negative except as noted in HPI or above.      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    Past Surgical History:  She has a past surgical history that includes Other surgical history (07/05/2019); Other surgical history (01/09/2020); Other surgical history (01/09/2020); Other surgical history (01/09/2020); and Hysterectomy.      Social History:  She reports that she has been smoking cigarettes. She has never used smokeless tobacco. She reports that she does not currently use alcohol. She reports that she does not use drugs.    Family History:  No known family history of GI disease, specifically denies any family history of pancreatitis, Crohn's, colon cancer, gastroesophageal cancer, or ulcerative colitis.    Family History   Problem Relation Name Age of Onset   • Breast cancer Mother     • Pancreatic cancer Father     • Liver cancer Father     • Heart disease  "Maternal Grandmother     • Stroke Paternal Grandmother          Allergies:  Shellfish derived, Clindamycin, and Penicillins      Objective   OBJECTIVE:       Last Recorded Vitals:  Vitals:    02/14/25 1358   BP: 127/84   BP Location: Left arm   Patient Position: Sitting   BP Cuff Size: Large adult   Pulse: 83   SpO2: 95%   Weight: 119 kg (262 lb 9.6 oz)   Height: 1.702 m (5' 7\")     /84 (BP Location: Left arm, Patient Position: Sitting, BP Cuff Size: Large adult)   Pulse 83   Ht 1.702 m (5' 7\")   Wt 119 kg (262 lb 9.6 oz)   SpO2 95%   BMI 41.13 kg/m²      Physical Exam:    Physical Exam  Vitals reviewed.   Constitutional:       General: She is not in acute distress.     Appearance: She is obese. She is not ill-appearing.   HENT:      Head: Normocephalic and atraumatic.   Eyes:      General: No scleral icterus.  Cardiovascular:      Rate and Rhythm: Normal rate and regular rhythm.      Pulses: Normal pulses.      Heart sounds: Normal heart sounds. No murmur heard.  Pulmonary:      Effort: Pulmonary effort is normal. No respiratory distress.      Breath sounds: Normal breath sounds. No wheezing.   Abdominal:      General: Bowel sounds are normal.      Palpations: Abdomen is soft.      Tenderness: There is no abdominal tenderness. There is no rebound.   Musculoskeletal:         General: No swelling or deformity.   Skin:     General: Skin is warm and dry.      Coloration: Skin is not jaundiced.      Findings: No rash.   Neurological:      General: No focal deficit present.      Mental Status: She is alert and oriented to person, place, and time.   Psychiatric:         Mood and Affect: Mood normal.         Behavior: Behavior normal.         Thought Content: Thought content normal.         Judgment: Judgment normal.         Home Medications:  Prior to Admission medications    Not on File         Relevant Results Recent labs reviewed in the EMR.    Lab Results   Component Value Date/Time    WBC 8.6 10/21/2024 " "0926    HGB 17.0 (H) 10/21/2024 0926    HGB 14.5 03/15/2022 2112    HGB 15.8 02/01/2021 1542    HGB 15.1 09/04/2020 1618    MCV 88 10/21/2024 0926     10/21/2024 0926     03/15/2022 2112     02/01/2021 1542     09/04/2020 1618       Lab Results   Component Value Date/Time     10/21/2024 0926    K 3.9 10/21/2024 0926     10/21/2024 0926    BUN 12 10/21/2024 0926    CREATININE 0.79 10/21/2024 0926    CREATININE 0.74 03/15/2022 2258    CREATININE 0.87 02/01/2021 1542       Lab Results   Component Value Date/Time    BILITOT 0.4 10/21/2024 0926    BILITOT 0.4 02/01/2021 1542    BILITOT 0.3 11/10/2019 1157    ALKPHOS 70 10/21/2024 0926    ALKPHOS 71 02/01/2021 1542    ALKPHOS 61 11/10/2019 1157    AST 21 10/21/2024 0926    AST 24 02/01/2021 1542    AST 25 11/10/2019 1157    ALT 26 10/21/2024 0926    ALT 25 02/01/2021 1542    ALT 21 11/10/2019 1157    LIPASE 37 10/21/2024 0926       No results found for: \"CRP\", \"CALPS\"    Radiology: Imaging reviewed in the EMR.    === 10/21/24 ===    CT ABDOMEN PELVIS W IV CONTRAST    - Impression -  No acute abnormality.    Stable bilateral adrenal nodules likely adenomas.    Hepatomegaly with hepatic steatosis.    Signed by: Mirna Sánchez 10/21/2024 12:09 PM  Dictation workstation:   [id number]        "

## 2025-03-11 ENCOUNTER — APPOINTMENT (OUTPATIENT)
Dept: RADIOLOGY | Facility: HOSPITAL | Age: 45
End: 2025-03-11
Payer: COMMERCIAL

## 2025-03-14 ENCOUNTER — APPOINTMENT (OUTPATIENT)
Facility: CLINIC | Age: 45
End: 2025-03-14
Payer: COMMERCIAL

## 2025-03-20 ENCOUNTER — TELEPHONE (OUTPATIENT)
Facility: CLINIC | Age: 45
End: 2025-03-20
Payer: COMMERCIAL

## 2025-03-20 NOTE — TELEPHONE ENCOUNTER
Patient is scheduled for colonoscopy 3/24/25.  She currently has an infected boil at the end of her tailbone that is increasing in size.  She has had to have this cut open in the past.  She is asking if she should reschedule procedure until she can have this taken care of

## 2025-03-24 ENCOUNTER — APPOINTMENT (OUTPATIENT)
Dept: GASTROENTEROLOGY | Facility: HOSPITAL | Age: 45
End: 2025-03-24
Payer: COMMERCIAL

## 2025-05-15 ENCOUNTER — PREP FOR PROCEDURE (OUTPATIENT)
Facility: CLINIC | Age: 45
End: 2025-05-15
Payer: COMMERCIAL

## 2025-05-15 RX ORDER — SODIUM CHLORIDE 9 MG/ML
20 INJECTION, SOLUTION INTRAVENOUS CONTINUOUS
OUTPATIENT
Start: 2025-05-15 | End: 2025-05-15

## 2025-05-19 ENCOUNTER — APPOINTMENT (OUTPATIENT)
Dept: GASTROENTEROLOGY | Facility: HOSPITAL | Age: 45
End: 2025-05-19
Payer: COMMERCIAL

## 2025-06-23 ENCOUNTER — APPOINTMENT (OUTPATIENT)
Dept: GASTROENTEROLOGY | Facility: HOSPITAL | Age: 45
End: 2025-06-23
Payer: COMMERCIAL

## 2025-08-26 ENCOUNTER — HOSPITAL ENCOUNTER (EMERGENCY)
Facility: HOSPITAL | Age: 45
Discharge: HOME | End: 2025-08-27
Attending: STUDENT IN AN ORGANIZED HEALTH CARE EDUCATION/TRAINING PROGRAM
Payer: COMMERCIAL

## 2025-08-26 DIAGNOSIS — L03.213 PRESEPTAL CELLULITIS OF RIGHT EYE: Primary | ICD-10-CM

## 2025-08-26 PROCEDURE — 83605 ASSAY OF LACTIC ACID: CPT | Performed by: NURSE PRACTITIONER

## 2025-08-26 PROCEDURE — 99285 EMERGENCY DEPT VISIT HI MDM: CPT | Performed by: STUDENT IN AN ORGANIZED HEALTH CARE EDUCATION/TRAINING PROGRAM

## 2025-08-26 PROCEDURE — 2500000005 HC RX 250 GENERAL PHARMACY W/O HCPCS: Performed by: NURSE PRACTITIONER

## 2025-08-26 PROCEDURE — 36415 COLL VENOUS BLD VENIPUNCTURE: CPT | Performed by: NURSE PRACTITIONER

## 2025-08-26 PROCEDURE — 82435 ASSAY OF BLOOD CHLORIDE: CPT | Performed by: NURSE PRACTITIONER

## 2025-08-26 PROCEDURE — 85025 COMPLETE CBC W/AUTO DIFF WBC: CPT | Performed by: NURSE PRACTITIONER

## 2025-08-26 PROCEDURE — 84702 CHORIONIC GONADOTROPIN TEST: CPT | Performed by: NURSE PRACTITIONER

## 2025-08-26 RX ORDER — TETRACAINE HYDROCHLORIDE 5 MG/ML
2 SOLUTION OPHTHALMIC ONCE
Status: COMPLETED | OUTPATIENT
Start: 2025-08-26 | End: 2025-08-26

## 2025-08-26 RX ORDER — FLUORESCEIN SODIUM 1 MG/MG
1 STRIP OPHTHALMIC ONCE
Status: COMPLETED | OUTPATIENT
Start: 2025-08-26 | End: 2025-08-26

## 2025-08-26 RX ADMIN — FLUORESCEIN SODIUM 1 STRIP: 1 STRIP OPHTHALMIC at 23:58

## 2025-08-26 RX ADMIN — TETRACAINE HYDROCHLORIDE 2 DROP: 5 SOLUTION OPHTHALMIC at 23:58

## 2025-08-26 ASSESSMENT — LIFESTYLE VARIABLES
EVER HAD A DRINK FIRST THING IN THE MORNING TO STEADY YOUR NERVES TO GET RID OF A HANGOVER: NO
EVER FELT BAD OR GUILTY ABOUT YOUR DRINKING: NO
HAVE YOU EVER FELT YOU SHOULD CUT DOWN ON YOUR DRINKING: NO
HAVE PEOPLE ANNOYED YOU BY CRITICIZING YOUR DRINKING: NO
TOTAL SCORE: 0

## 2025-08-26 ASSESSMENT — PAIN - FUNCTIONAL ASSESSMENT: PAIN_FUNCTIONAL_ASSESSMENT: 0-10

## 2025-08-26 ASSESSMENT — PAIN SCALES - GENERAL: PAINLEVEL_OUTOF10: 7

## 2025-08-27 ENCOUNTER — APPOINTMENT (OUTPATIENT)
Dept: RADIOLOGY | Facility: HOSPITAL | Age: 45
End: 2025-08-27
Payer: COMMERCIAL

## 2025-08-27 VITALS
TEMPERATURE: 98.2 F | SYSTOLIC BLOOD PRESSURE: 156 MMHG | HEIGHT: 67 IN | DIASTOLIC BLOOD PRESSURE: 85 MMHG | OXYGEN SATURATION: 99 % | HEART RATE: 70 BPM | RESPIRATION RATE: 18 BRPM | BODY MASS INDEX: 42.38 KG/M2 | WEIGHT: 270 LBS

## 2025-08-27 LAB
ALBUMIN SERPL BCP-MCNC: 4.1 G/DL (ref 3.4–5)
ALP SERPL-CCNC: 59 U/L (ref 33–110)
ALT SERPL W P-5'-P-CCNC: 17 U/L (ref 7–45)
ANION GAP SERPL CALC-SCNC: 12 MMOL/L (ref 10–20)
AST SERPL W P-5'-P-CCNC: 17 U/L (ref 9–39)
B-HCG SERPL-ACNC: 7 MIU/ML
BASOPHILS # BLD AUTO: 0.12 X10*3/UL (ref 0–0.1)
BASOPHILS NFR BLD AUTO: 1.1 %
BILIRUB SERPL-MCNC: 0.4 MG/DL (ref 0–1.2)
BUN SERPL-MCNC: 15 MG/DL (ref 6–23)
CALCIUM SERPL-MCNC: 9 MG/DL (ref 8.6–10.3)
CHLORIDE SERPL-SCNC: 105 MMOL/L (ref 98–107)
CO2 SERPL-SCNC: 26 MMOL/L (ref 21–32)
CREAT SERPL-MCNC: 0.66 MG/DL (ref 0.5–1.05)
EGFRCR SERPLBLD CKD-EPI 2021: >90 ML/MIN/1.73M*2
EOSINOPHIL # BLD AUTO: 0.39 X10*3/UL (ref 0–0.7)
EOSINOPHIL NFR BLD AUTO: 3.7 %
ERYTHROCYTE [DISTWIDTH] IN BLOOD BY AUTOMATED COUNT: 12.4 % (ref 11.5–14.5)
GLUCOSE SERPL-MCNC: 106 MG/DL (ref 74–99)
HCT VFR BLD AUTO: 41.6 % (ref 36–46)
HGB BLD-MCNC: 14.4 G/DL (ref 12–16)
IMM GRANULOCYTES # BLD AUTO: 0.04 X10*3/UL (ref 0–0.7)
IMM GRANULOCYTES NFR BLD AUTO: 0.4 % (ref 0–0.9)
LACTATE SERPL-SCNC: 0.7 MMOL/L (ref 0.4–2)
LYMPHOCYTES # BLD AUTO: 3.71 X10*3/UL (ref 1.2–4.8)
LYMPHOCYTES NFR BLD AUTO: 35.4 %
MCH RBC QN AUTO: 30.2 PG (ref 26–34)
MCHC RBC AUTO-ENTMCNC: 34.6 G/DL (ref 32–36)
MCV RBC AUTO: 87 FL (ref 80–100)
MONOCYTES # BLD AUTO: 0.57 X10*3/UL (ref 0.1–1)
MONOCYTES NFR BLD AUTO: 5.4 %
NEUTROPHILS # BLD AUTO: 5.65 X10*3/UL (ref 1.2–7.7)
NEUTROPHILS NFR BLD AUTO: 54 %
NRBC BLD-RTO: 0 /100 WBCS (ref 0–0)
PLATELET # BLD AUTO: 265 X10*3/UL (ref 150–450)
POTASSIUM SERPL-SCNC: 3.7 MMOL/L (ref 3.5–5.3)
PROT SERPL-MCNC: 7.3 G/DL (ref 6.4–8.2)
RBC # BLD AUTO: 4.77 X10*6/UL (ref 4–5.2)
SODIUM SERPL-SCNC: 139 MMOL/L (ref 136–145)
WBC # BLD AUTO: 10.5 X10*3/UL (ref 4.4–11.3)

## 2025-08-27 PROCEDURE — 70481 CT ORBIT/EAR/FOSSA W/DYE: CPT

## 2025-08-27 PROCEDURE — 2550000001 HC RX 255 CONTRASTS: Performed by: STUDENT IN AN ORGANIZED HEALTH CARE EDUCATION/TRAINING PROGRAM

## 2025-08-27 PROCEDURE — 70481 CT ORBIT/EAR/FOSSA W/DYE: CPT | Performed by: STUDENT IN AN ORGANIZED HEALTH CARE EDUCATION/TRAINING PROGRAM

## 2025-08-27 PROCEDURE — 2500000001 HC RX 250 WO HCPCS SELF ADMINISTERED DRUGS (ALT 637 FOR MEDICARE OP): Performed by: NURSE PRACTITIONER

## 2025-08-27 PROCEDURE — 2500000002 HC RX 250 W HCPCS SELF ADMINISTERED DRUGS (ALT 637 FOR MEDICARE OP, ALT 636 FOR OP/ED): Performed by: NURSE PRACTITIONER

## 2025-08-27 RX ORDER — CEFDINIR 300 MG/1
300 CAPSULE ORAL ONCE
Status: COMPLETED | OUTPATIENT
Start: 2025-08-27 | End: 2025-08-27

## 2025-08-27 RX ORDER — SULFAMETHOXAZOLE AND TRIMETHOPRIM 800; 160 MG/1; MG/1
1 TABLET ORAL ONCE
Status: COMPLETED | OUTPATIENT
Start: 2025-08-27 | End: 2025-08-27

## 2025-08-27 RX ORDER — CEFDINIR 300 MG/1
300 CAPSULE ORAL 2 TIMES DAILY
Qty: 14 CAPSULE | Refills: 0 | Status: SHIPPED | OUTPATIENT
Start: 2025-08-27 | End: 2025-09-03

## 2025-08-27 RX ORDER — SULFAMETHOXAZOLE AND TRIMETHOPRIM 800; 160 MG/1; MG/1
1 TABLET ORAL 2 TIMES DAILY
Qty: 14 TABLET | Refills: 0 | Status: SHIPPED | OUTPATIENT
Start: 2025-08-27 | End: 2025-09-03

## 2025-08-27 RX ADMIN — CEFDINIR 300 MG: 300 CAPSULE ORAL at 01:34

## 2025-08-27 RX ADMIN — IOHEXOL 75 ML: 350 INJECTION, SOLUTION INTRAVENOUS at 00:58

## 2025-08-27 RX ADMIN — SULFAMETHOXAZOLE AND TRIMETHOPRIM 1 TABLET: 800; 160 TABLET ORAL at 01:34

## 2025-08-27 ASSESSMENT — VISUAL ACUITY
OS: 20/50
OD: 20/40
OU: 20/25
OU: 1

## 2025-09-03 ENCOUNTER — HOSPITAL ENCOUNTER (OUTPATIENT)
Dept: RADIOLOGY | Facility: HOSPITAL | Age: 45
Discharge: HOME | End: 2025-09-03
Payer: COMMERCIAL

## 2025-09-03 VITALS — HEIGHT: 67 IN | BODY MASS INDEX: 42.38 KG/M2 | WEIGHT: 270 LBS

## 2025-09-03 DIAGNOSIS — Z12.31 SCREENING MAMMOGRAM FOR BREAST CANCER: ICD-10-CM

## 2025-09-03 PROCEDURE — 77067 SCR MAMMO BI INCL CAD: CPT | Performed by: RADIOLOGY

## 2025-09-03 PROCEDURE — 77063 BREAST TOMOSYNTHESIS BI: CPT

## 2025-09-03 PROCEDURE — 77063 BREAST TOMOSYNTHESIS BI: CPT | Performed by: RADIOLOGY
